# Patient Record
Sex: MALE | Race: WHITE | Employment: OTHER | URBAN - NONMETROPOLITAN AREA
[De-identification: names, ages, dates, MRNs, and addresses within clinical notes are randomized per-mention and may not be internally consistent; named-entity substitution may affect disease eponyms.]

---

## 2023-01-01 DIAGNOSIS — J44.9 CHRONIC OBSTRUCTIVE PULMONARY DISEASE, UNSPECIFIED COPD TYPE (HCC): Primary | ICD-10-CM

## 2023-06-22 ENCOUNTER — TELEPHONE (OUTPATIENT)
Dept: PAIN MANAGEMENT | Age: 88
End: 2023-06-22

## 2023-06-23 RX ORDER — CYCLOBENZAPRINE HCL 5 MG
5 TABLET ORAL 3 TIMES DAILY PRN
COMMUNITY
Start: 2023-06-19

## 2023-06-23 RX ORDER — GUAIFENESIN 600 MG/1
TABLET, EXTENDED RELEASE ORAL
COMMUNITY
Start: 2019-10-18

## 2023-06-23 RX ORDER — POTASSIUM CHLORIDE 750 MG/1
TABLET, EXTENDED RELEASE ORAL
COMMUNITY
Start: 2023-02-27

## 2023-06-23 RX ORDER — SPIRONOLACTONE 50 MG/1
TABLET, FILM COATED ORAL
COMMUNITY
Start: 2023-06-22

## 2023-06-23 RX ORDER — METOPROLOL TARTRATE 100 MG/1
100 TABLET ORAL 2 TIMES DAILY
COMMUNITY

## 2023-06-23 RX ORDER — ROFLUMILAST 500 UG/1
500 TABLET ORAL DAILY
COMMUNITY
Start: 2022-03-03

## 2023-06-23 RX ORDER — AMOXICILLIN AND CLAVULANATE POTASSIUM 875; 125 MG/1; MG/1
1 TABLET, FILM COATED ORAL
COMMUNITY
Start: 2023-06-03

## 2023-06-23 RX ORDER — ALBUTEROL SULFATE 90 UG/1
1-2 AEROSOL, METERED RESPIRATORY (INHALATION) EVERY 4 HOURS PRN
COMMUNITY
Start: 2022-11-14 | End: 2028-03-14

## 2023-06-23 RX ORDER — GUAIFENESIN AND CODEINE PHOSPHATE 100; 10 MG/5ML; MG/5ML
SOLUTION ORAL
COMMUNITY
Start: 2023-06-03

## 2023-06-23 RX ORDER — TORSEMIDE 20 MG/1
TABLET ORAL
COMMUNITY
Start: 2023-02-27 | End: 2024-05-02

## 2023-06-23 RX ORDER — ALBUTEROL SULFATE 2.5 MG/3ML
SOLUTION RESPIRATORY (INHALATION)
COMMUNITY
Start: 2023-05-08

## 2023-06-23 RX ORDER — IPRATROPIUM BROMIDE AND ALBUTEROL SULFATE 2.5; .5 MG/3ML; MG/3ML
SOLUTION RESPIRATORY (INHALATION)
COMMUNITY
Start: 2023-06-21

## 2023-06-23 RX ORDER — MAGNESIUM CARB/ALUMINUM HYDROX 105-160MG
1 TABLET,CHEWABLE ORAL
COMMUNITY

## 2023-06-23 RX ORDER — FLUTICASONE FUROATE, UMECLIDINIUM BROMIDE AND VILANTEROL TRIFENATATE 100; 62.5; 25 UG/1; UG/1; UG/1
POWDER RESPIRATORY (INHALATION)
COMMUNITY
Start: 2023-06-19

## 2023-07-05 ENCOUNTER — OFFICE VISIT (OUTPATIENT)
Dept: PAIN MANAGEMENT | Age: 88
End: 2023-07-05
Payer: MEDICARE

## 2023-07-05 VITALS — DIASTOLIC BLOOD PRESSURE: 62 MMHG | HEART RATE: 114 BPM | OXYGEN SATURATION: 96 % | SYSTOLIC BLOOD PRESSURE: 90 MMHG

## 2023-07-05 DIAGNOSIS — Z98.890 HISTORY OF PNEUMONECTOMY: ICD-10-CM

## 2023-07-05 DIAGNOSIS — Z90.2 HISTORY OF PNEUMONECTOMY: ICD-10-CM

## 2023-07-05 DIAGNOSIS — L03.311 ABDOMINAL WALL CELLULITIS: Primary | ICD-10-CM

## 2023-07-05 DIAGNOSIS — S22.000A THORACIC COMPRESSION FRACTURE, CLOSED, INITIAL ENCOUNTER (HCC): ICD-10-CM

## 2023-07-05 DIAGNOSIS — J18.9 LUNG INFECTION: ICD-10-CM

## 2023-07-05 PROCEDURE — 1123F ACP DISCUSS/DSCN MKR DOCD: CPT | Performed by: PHYSICAL MEDICINE & REHABILITATION

## 2023-07-05 PROCEDURE — 1036F TOBACCO NON-USER: CPT | Performed by: PHYSICAL MEDICINE & REHABILITATION

## 2023-07-05 PROCEDURE — 99204 OFFICE O/P NEW MOD 45 MIN: CPT | Performed by: PHYSICAL MEDICINE & REHABILITATION

## 2023-07-05 PROCEDURE — G8427 DOCREV CUR MEDS BY ELIG CLIN: HCPCS | Performed by: PHYSICAL MEDICINE & REHABILITATION

## 2023-07-05 PROCEDURE — G8421 BMI NOT CALCULATED: HCPCS | Performed by: PHYSICAL MEDICINE & REHABILITATION

## 2023-07-05 RX ORDER — OXYCODONE AND ACETAMINOPHEN 10; 325 MG/1; MG/1
1 TABLET ORAL EVERY 6 HOURS PRN
Qty: 120 TABLET | Refills: 0 | Status: SHIPPED | OUTPATIENT
Start: 2023-07-05 | End: 2023-08-04

## 2023-07-05 RX ORDER — VITAMIN B COMPLEX
1 CAPSULE ORAL DAILY
COMMUNITY

## 2023-07-05 RX ORDER — HYDROCODONE BITARTRATE AND ACETAMINOPHEN 5; 325 MG/1; MG/1
1 TABLET ORAL PRN
COMMUNITY
Start: 2023-06-28

## 2023-07-05 ASSESSMENT — ENCOUNTER SYMPTOMS
CONSTIPATION: 0
ALLERGIC/IMMUNOLOGIC NEGATIVE: 1
NAUSEA: 0
BACK PAIN: 1
EYES NEGATIVE: 1
RESPIRATORY NEGATIVE: 1

## 2023-07-05 NOTE — PROGRESS NOTES
PAIN MANAGEMENTNEW PATIENT CONSULTATION  7/5/23    Tonya Warren  1934    ReferringProvider:  No referring provider defined for this encounter. Primary Care Physician:  [address]  [facility] [address]    HPIinformation obtained from the patient as well as the Comprehensive Pain ManagementHealth Questionnaire filled out by the patient. The questionnaire will be scannedinto the electronic chart and PMH, PSH, meds, and allergies will be updates accordingly. Subjective:       CHIEF COMPLAINT:  This is a80 y.o. male patientwho presents with a complaint of Pain (Lower back pain. ) and New Patient (Referred by dr. Mervin Aparicio for back pain )      PAIN HPI:    2 month hx of  increased coughing  and [place] in patient with COPD  noted   pain with any movement in along mid line spine and  B nthoracolumbar  musculature  Denies pain down legs   Pain is not responding to [place] 5        Location: Back thoracic  Location Modifier:  -  Severity of Pain: 7-10  Duration of Pain: Intermittent  Frequency of Pain: Intermittent  Aggravating Factors:  -, Stretching, Bending, Straightening, Stairs, Exercise, Kneeling, and Squatting breathing  Limiting Behavior: no  Relieving Factors: Heat, Cold, and Medication -  Result of Injury: no  Work Related Injury: no  [place] of Worker Compensation Case: no      Prior evaluation:    Previous lower back problems:No    Previous workup: No   History of surgery in painful area:No    Previous pain medication trials have included:       Opioids:? NSAID's:-     Muscle relaxants: -     Neuropathicpain meds: -    Previous Pain Management Physician: No   Nerve blocks, spinal injections:No   Typeof Pain Intervention -. Date of last Pain Intervention . Was there pain relief from Pain Intervention: No.    How long was your pain relief from the Pain Intervention.   Sleep:       Difficultyfalling asleep:  No   Takes sleepingmedication: No    Mental health: